# Patient Record
Sex: FEMALE | Race: WHITE | NOT HISPANIC OR LATINO | Employment: OTHER | URBAN - METROPOLITAN AREA
[De-identification: names, ages, dates, MRNs, and addresses within clinical notes are randomized per-mention and may not be internally consistent; named-entity substitution may affect disease eponyms.]

---

## 2017-02-09 ENCOUNTER — OFFICE VISIT (OUTPATIENT)
Dept: FAMILY MEDICINE CLINIC | Facility: CLINIC | Age: 82
End: 2017-02-09

## 2017-02-09 VITALS
HEART RATE: 69 BPM | DIASTOLIC BLOOD PRESSURE: 60 MMHG | TEMPERATURE: 98.2 F | OXYGEN SATURATION: 99 % | SYSTOLIC BLOOD PRESSURE: 120 MMHG

## 2017-02-09 DIAGNOSIS — N18.30 CHRONIC KIDNEY DISEASE, STAGE III (MODERATE) (HCC): ICD-10-CM

## 2017-02-09 DIAGNOSIS — I10 ESSENTIAL HYPERTENSION: Primary | ICD-10-CM

## 2017-02-09 DIAGNOSIS — IMO0001 UNCONTROLLED TYPE 2 DIABETES MELLITUS WITHOUT COMPLICATION, WITHOUT LONG-TERM CURRENT USE OF INSULIN: ICD-10-CM

## 2017-02-09 DIAGNOSIS — E78.49 OTHER HYPERLIPIDEMIA: ICD-10-CM

## 2017-02-09 LAB
ALBUMIN SERPL-MCNC: 4.3 G/DL (ref 3.5–5.2)
ALBUMIN/GLOB SERPL: 1.7 G/DL
ALP SERPL-CCNC: 64 U/L (ref 39–117)
ALT SERPL-CCNC: 18 U/L (ref 1–33)
AST SERPL-CCNC: 18 U/L (ref 1–32)
BILIRUB SERPL-MCNC: 1.5 MG/DL (ref 0.1–1.2)
BUN SERPL-MCNC: 24 MG/DL (ref 8–23)
BUN/CREAT SERPL: 16 (ref 7–25)
CALCIUM SERPL-MCNC: 10 MG/DL (ref 8.6–10.5)
CHLORIDE SERPL-SCNC: 99 MMOL/L (ref 98–107)
CHOLEST SERPL-MCNC: 173 MG/DL (ref 0–200)
CO2 SERPL-SCNC: 28 MMOL/L (ref 22–29)
CREAT SERPL-MCNC: 1.5 MG/DL (ref 0.57–1)
GLOBULIN SER CALC-MCNC: 2.6 GM/DL
GLUCOSE SERPL-MCNC: 158 MG/DL (ref 65–99)
HBA1C MFR BLD: 8 % (ref 4.8–5.6)
HDLC SERPL-MCNC: 59 MG/DL (ref 40–60)
LDLC SERPL CALC-MCNC: 90 MG/DL (ref 0–100)
LDLC/HDLC SERPL: 1.53 {RATIO}
POTASSIUM SERPL-SCNC: 4.5 MMOL/L (ref 3.5–5.2)
PROT SERPL-MCNC: 6.9 G/DL (ref 6–8.5)
SODIUM SERPL-SCNC: 141 MMOL/L (ref 136–145)
TRIGL SERPL-MCNC: 120 MG/DL (ref 0–150)
VLDLC SERPL CALC-MCNC: 24 MG/DL (ref 5–40)

## 2017-02-09 PROCEDURE — 99213 OFFICE O/P EST LOW 20 MIN: CPT | Performed by: FAMILY MEDICINE

## 2017-02-09 NOTE — PROGRESS NOTES
Subjective   Carrie Workman is a 83 y.o. female. CC: high cholesterol  CC: hyperlipidemia  History of Present Illness   Carrie is an 83 year old female who comes in today for her blood pressure and cholesterol checks.  She reports that she is doing about the same.  Her daughter states that she eats very little.  They have tried to get her to drink Ensure but she refuses to do that.  She has an appointment to see Dr. Mishra in 2 weeks.        The following portions of the patient's history were reviewed and updated as appropriate: allergies, current medications, past medical history, past social history, past surgical history and problem list.    Review of Systems   Constitutional: Positive for appetite change. Negative for fatigue and unexpected weight change.   HENT: Negative.  Negative for congestion.    Respiratory: Negative.  Negative for cough, shortness of breath and wheezing.    Cardiovascular: Negative.  Negative for chest pain, palpitations and leg swelling.   Gastrointestinal: Negative.  Negative for abdominal pain, blood in stool, nausea and vomiting.   Genitourinary: Positive for difficulty urinating.   Musculoskeletal: Negative.  Negative for arthralgias and back pain.   Skin: Negative.  Negative for rash.   Neurological: Negative.  Negative for dizziness, light-headedness and headaches.   Psychiatric/Behavioral: Negative.  Negative for confusion, dysphoric mood and sleep disturbance. The patient is not nervous/anxious.        Objective   Physical Exam   Constitutional: She is oriented to person, place, and time. She appears well-developed and well-nourished.   Neck: Normal range of motion. Neck supple. No JVD present. Carotid bruit is not present. No thyromegaly present.   Cardiovascular: Normal rate, regular rhythm and normal heart sounds.    No murmur heard.  Pulmonary/Chest: Effort normal and breath sounds normal. No respiratory distress.   Lymphadenopathy:     She has no cervical adenopathy.    Neurological: She is alert and oriented to person, place, and time.   Skin: Skin is warm and dry. No rash noted.   Psychiatric: She has a normal mood and affect. Her behavior is normal.   Nursing note and vitals reviewed.    Vitals:    02/09/17 1001   BP: 120/60   Pulse: 69   Temp: 98.2 °F (36.8 °C)   SpO2: 99%     Assessment/Plan   Problems Addressed this Visit        Cardiovascular and Mediastinum    Hypertension - Primary    Hyperlipidemia       Endocrine    Uncontrolled type 2 diabetes mellitus without complication, without long-term current use of insulin    Relevant Orders    Comprehensive metabolic panel    Lipid Panel With LDL/HDL Ratio    Hemoglobin A1c       Genitourinary    Chronic kidney disease, stage III (moderate)    Relevant Orders    Comprehensive metabolic panel

## 2017-02-21 ENCOUNTER — OFFICE VISIT (OUTPATIENT)
Dept: CARDIOLOGY | Facility: CLINIC | Age: 82
End: 2017-02-21

## 2017-02-21 VITALS
BODY MASS INDEX: 27.48 KG/M2 | DIASTOLIC BLOOD PRESSURE: 68 MMHG | SYSTOLIC BLOOD PRESSURE: 118 MMHG | WEIGHT: 140 LBS | HEART RATE: 68 BPM | HEIGHT: 60 IN

## 2017-02-21 DIAGNOSIS — I10 ESSENTIAL HYPERTENSION: ICD-10-CM

## 2017-02-21 DIAGNOSIS — N18.30 CHRONIC KIDNEY DISEASE, STAGE III (MODERATE) (HCC): ICD-10-CM

## 2017-02-21 DIAGNOSIS — I11.9 BENIGN HYPERTENSIVE HEART DISEASE WITHOUT CONGESTIVE HEART FAILURE: Primary | ICD-10-CM

## 2017-02-21 DIAGNOSIS — IMO0001 UNCONTROLLED TYPE 2 DIABETES MELLITUS WITHOUT COMPLICATION, WITHOUT LONG-TERM CURRENT USE OF INSULIN: ICD-10-CM

## 2017-02-21 PROCEDURE — 99213 OFFICE O/P EST LOW 20 MIN: CPT | Performed by: INTERNAL MEDICINE

## 2017-02-21 PROCEDURE — 93000 ELECTROCARDIOGRAM COMPLETE: CPT | Performed by: INTERNAL MEDICINE

## 2017-02-21 NOTE — PROGRESS NOTES
Date of Office Visit: 2017  Encounter Provider: Aishwarya Mishra MD  Place of Service: [facility] CARDIOLOGY  Patient Name: Carrie Workman  :3/2/1933    Chief complaint  Follow-up of hypertension with hypertensive heart disease.    History of Present Illness  The patient is an 83-year-old female with history of hypertension, hyperlipidemia, diabetes, and hypertensive heart disease. She had a stress perfusion study in  that was negative for ischemia. An echocardiogram in  revealed mild left ventricular hypertrophy, normal systolic function, mild left atrial enlargement, and aortic valve sclerosis.  In  due to an abnormal EKG she had a Lexiscan perfusion study that was negative for ischemia.  Clinically, she has done well. She now presents for follow-up.     Since last visit she remains wheelchair-bound.  She is not receiving physical therapy her blood pressures been as it is today.  She denies any chest pain, shortness of breath, palpitations, syncope.  Overall she feels well.  She had blood work in February the creatinine of 1.5 and a GFR 40.  Lipids were under excellent control.  Hemeglobin A1c remains elevated    Past Medical History   Diagnosis Date   • Abnormal electrocardiogram    • Ataxia      chronic, unknown etiology   • Benign hypertensive heart disease    • CHF (congestive heart failure)    • CKD (chronic kidney disease)    • Diabetes mellitus    • Dyslipidemia    • Health care maintenance    • History of EKG 2013   • Hyperlipidemia    • Hypertension    • Renal cyst    • Renal insufficiency    • TIA (transient ischemic attack)         • Urinary frequency    • Visit for screening mammogram      No past surgical history on file.  Outpatient Medications Prior to Visit   Medication Sig Dispense Refill   • aspirin 81 MG tablet Take 81 mg by mouth daily.     • bumetanide (BUMEX) 2 MG tablet Take 1 mg by mouth 3 (three) times a week.   Friday     • Calcium-Vitamin D-Vitamin K (VIACTIV) 500-500-40 MG-UNT-MCG chewable tablet Chew 1 tablet daily.     • fosinopril (MONOPRIL) 10 MG tablet Take 1 tablet by mouth daily. 30 tablet 3   • metoprolol succinate XL (TOPROL-XL) 50 MG 24 hr tablet Take 1 tablet (50mg) po daily (Patient taking differently: Take 50 mg by mouth 2 (Two) Times a Day.)     • pravastatin (PRAVACHOL) 40 MG tablet Take 1 tablet by mouth daily. 90 tablet 3   • vitamin E 400 UNIT capsule Take 400 Units by mouth.     • fosinopril (MONOPRIL) 20 MG tablet Take 1 tablet by mouth daily. 90 tablet 1   • HYDROcodone-acetaminophen (NORCO) 5-325 MG per tablet Take 1 tablet by mouth Every 4 (Four) Hours As Needed for moderate pain (4-6). 12 tablet 0     No facility-administered medications prior to visit.      Allergies as of 02/21/2017   • (No Known Allergies)     Social History     Social History   • Marital status:      Spouse name: N/A   • Number of children: N/A   • Years of education: N/A     Occupational History   • Not on file.     Social History Main Topics   • Smoking status: Former Smoker   • Smokeless tobacco: Not on file   • Alcohol use No   • Drug use: Defer   • Sexual activity: Defer     Other Topics Concern   • Not on file     Social History Narrative     Family History   Problem Relation Age of Onset   • Cancer Mother      malignant neoplasm of urinary bladder   • Diabetes Father    • Lung cancer Father    • Diabetes Other    • Lung cancer Other      Review of Systems   Constitution: Negative for fever, malaise/fatigue, weight gain and weight loss.   HENT: Negative for ear pain, hearing loss, nosebleeds and sore throat.    Eyes: Negative for double vision, pain, vision loss in left eye and vision loss in right eye.   Cardiovascular:        See history of present illness.   Respiratory: Negative for cough, shortness of breath, sleep disturbances due to breathing, snoring and wheezing.    Endocrine: Negative for cold  "intolerance, heat intolerance and polyuria.   Skin: Negative for itching, poor wound healing and rash.   Musculoskeletal: Negative for joint pain, joint swelling and myalgias.   Gastrointestinal: Negative for abdominal pain, diarrhea, hematochezia, nausea and vomiting.   Genitourinary: Negative for hematuria and hesitancy.   Neurological: Negative for numbness, paresthesias and seizures.   Psychiatric/Behavioral: Negative for depression. The patient is not nervous/anxious.      Objective:     Vitals:    02/21/17 0952   BP: 118/68   Pulse: 68   Weight: 140 lb (63.5 kg)   Height: 60\" (152.4 cm)     Body mass index is 27.34 kg/(m^2).    Physical Exam   Constitutional: She is oriented to person, place, and time. She appears well-developed and well-nourished.   HENT:   Head: Normocephalic.   Nose: Nose normal.   Mouth/Throat: Oropharynx is clear and moist.   Eyes: Conjunctivae and EOM are normal. Pupils are equal, round, and reactive to light. Right eye exhibits no discharge. No scleral icterus.   Neck: Normal range of motion. Neck supple. No JVD present. No thyromegaly present.   Cardiovascular: Normal rate, regular rhythm, normal heart sounds and intact distal pulses.  Exam reveals no gallop and no friction rub.    No murmur heard.  Pulses:       Carotid pulses are 2+ on the right side, and 2+ on the left side.       Radial pulses are 2+ on the right side, and 2+ on the left side.        Femoral pulses are 2+ on the right side, and 2+ on the left side.       Popliteal pulses are 2+ on the right side, and 2+ on the left side.        Dorsalis pedis pulses are 2+ on the right side, and 2+ on the left side.        Posterior tibial pulses are 2+ on the right side, and 2+ on the left side.   Pulmonary/Chest: Effort normal and breath sounds normal. No respiratory distress. She has no wheezes. She has no rales.   Abdominal: Soft. Bowel sounds are normal. She exhibits no distension. There is no hepatosplenomegaly. There is no " tenderness. There is no rebound.   Musculoskeletal: Normal range of motion. She exhibits no edema or tenderness.   Neurological: She is alert and oriented to person, place, and time.   Skin: Skin is warm and dry. No rash noted. No erythema.   Psychiatric: She has a normal mood and affect. Her behavior is normal. Judgment and thought content normal.   Vitals reviewed.    Lab Review:     ECG 12 Lead  Date/Time: 2/21/2017 6:33 PM  Performed by: SANDRA MCHUGH  Authorized by: SANDRA MCHUGH   Comparison: compared with previous ECG   Similar to previous ECG  Rhythm: sinus rhythm  Conduction: incomplete RBBB and LAFB  Conduction comments: QTc = 451msec            Assessment:       Diagnosis Plan   1. Benign hypertensive heart disease without congestive heart failure     2. Essential hypertension     3. Uncontrolled type 2 diabetes mellitus without complication, without long-term current use of insulin     4. Chronic kidney disease, stage III (moderate)       Plan:       1.  Hypertension,, continue the current regimen follow-up in one year  2.  Hypertensive heart disease  3.  Sedentary state  4.  Diabetes  5.  Dyslipidemia  6.  Renal insufficiency followed by Dr. Mcneal and Dr. Hunt and felt to be stable     Carrie Workman   Home Medication Instructions [facility]:    Printed on:02/23/17 0136   Medication Information                      aspirin 81 MG tablet  Take 81 mg by mouth daily.             bumetanide (BUMEX) 2 MG tablet  Take 1 mg by mouth 3 (three) times a week. Monday Wednesday Friday             Calcium-Vitamin D-Vitamin K (VIACTIV) 500-500-40 MG-UNT-MCG chewable tablet  Chew 1 tablet daily.             fosinopril (MONOPRIL) 10 MG tablet  Take 1 tablet by mouth daily.             metoprolol succinate XL (TOPROL-XL) 50 MG 24 hr tablet  Take 1 tablet (50mg) po daily             pravastatin (PRAVACHOL) 40 MG tablet  Take 1 tablet by mouth daily.             vitamin E 400 UNIT capsule  Take 400 Units by mouth.                EMR Dragon/Transcription disclaimer:   Much of this encounter note is an electronic transcription/translation of spoken language to printed text. The electronic translation of spoken language may permit erroneous, or at times, nonsensical words or phrases to be inadvertently transcribed; Although I have reviewed the note for such errors, some may still exist.

## 2017-06-21 RX ORDER — METOPROLOL SUCCINATE 50 MG/1
TABLET, EXTENDED RELEASE ORAL
Qty: 180 TABLET | Refills: 0 | OUTPATIENT
Start: 2017-06-21

## 2017-06-21 RX ORDER — METOPROLOL SUCCINATE 50 MG/1
TABLET, EXTENDED RELEASE ORAL
Qty: 90 TABLET | Refills: 3 | Status: SHIPPED | OUTPATIENT
Start: 2017-06-21 | End: 2017-08-22 | Stop reason: SDUPTHER

## 2017-06-21 NOTE — TELEPHONE ENCOUNTER
Please confirm how she is taking this.  When I try to approve it gives me three different ways she is taking it

## 2017-07-06 DIAGNOSIS — E78.5 HYPERLIPIDEMIA: ICD-10-CM

## 2017-07-06 RX ORDER — PRAVASTATIN SODIUM 40 MG
TABLET ORAL
Qty: 90 TABLET | Refills: 3 | Status: SHIPPED | OUTPATIENT
Start: 2017-07-06 | End: 2018-06-25 | Stop reason: SDUPTHER

## 2017-07-17 RX ORDER — FOSINOPRIL SODIUM 10 MG/1
10 TABLET ORAL DAILY
Qty: 90 TABLET | Refills: 1 | Status: SHIPPED | OUTPATIENT
Start: 2017-07-17 | End: 2018-01-07 | Stop reason: SDUPTHER

## 2017-08-22 ENCOUNTER — TELEPHONE (OUTPATIENT)
Dept: FAMILY MEDICINE CLINIC | Facility: CLINIC | Age: 82
End: 2017-08-22

## 2017-08-22 RX ORDER — METOPROLOL SUCCINATE 50 MG/1
TABLET, EXTENDED RELEASE ORAL
Qty: 90 TABLET | Refills: 3 | Status: SHIPPED | OUTPATIENT
Start: 2017-08-22 | End: 2018-03-08

## 2017-08-22 NOTE — TELEPHONE ENCOUNTER
Pt daughter dov would like to speak with you (she is on pt hipaa) her daughter said that she has an appointment with you on the 24th. She said that pt lies to them about a lot and was wondering if she can inform you of some things she has noticed. She doesn't want to speak with me.

## 2017-08-24 ENCOUNTER — OFFICE VISIT (OUTPATIENT)
Dept: FAMILY MEDICINE CLINIC | Facility: CLINIC | Age: 82
End: 2017-08-24

## 2017-08-24 VITALS
WEIGHT: 141.5 LBS | HEART RATE: 84 BPM | TEMPERATURE: 98.4 F | HEIGHT: 60 IN | BODY MASS INDEX: 27.78 KG/M2 | SYSTOLIC BLOOD PRESSURE: 105 MMHG | DIASTOLIC BLOOD PRESSURE: 60 MMHG | OXYGEN SATURATION: 97 %

## 2017-08-24 DIAGNOSIS — E78.49 OTHER HYPERLIPIDEMIA: ICD-10-CM

## 2017-08-24 DIAGNOSIS — F41.9 ANXIETY: ICD-10-CM

## 2017-08-24 DIAGNOSIS — IMO0001 UNCONTROLLED TYPE 2 DIABETES MELLITUS WITHOUT COMPLICATION, WITHOUT LONG-TERM CURRENT USE OF INSULIN: Primary | ICD-10-CM

## 2017-08-24 DIAGNOSIS — IMO0001 UNCONTROLLED TYPE 2 DIABETES MELLITUS WITHOUT COMPLICATION, WITHOUT LONG-TERM CURRENT USE OF INSULIN: ICD-10-CM

## 2017-08-24 DIAGNOSIS — Z23 ENCOUNTER FOR IMMUNIZATION: ICD-10-CM

## 2017-08-24 DIAGNOSIS — I11.9 BENIGN HYPERTENSIVE HEART DISEASE WITHOUT CONGESTIVE HEART FAILURE: Primary | ICD-10-CM

## 2017-08-24 LAB
ALBUMIN SERPL-MCNC: 4.3 G/DL (ref 3.5–5.2)
ALBUMIN/GLOB SERPL: 1.5 G/DL
ALP SERPL-CCNC: 67 U/L (ref 39–117)
ALT SERPL-CCNC: 19 U/L (ref 1–33)
AST SERPL-CCNC: 19 U/L (ref 1–32)
BILIRUB SERPL-MCNC: 1.7 MG/DL (ref 0.1–1.2)
BUN SERPL-MCNC: 16 MG/DL (ref 8–23)
BUN/CREAT SERPL: 10.9 (ref 7–25)
CALCIUM SERPL-MCNC: 9.6 MG/DL (ref 8.6–10.5)
CHLORIDE SERPL-SCNC: 98 MMOL/L (ref 98–107)
CHOLEST SERPL-MCNC: 136 MG/DL (ref 0–200)
CO2 SERPL-SCNC: 25.8 MMOL/L (ref 22–29)
CREAT SERPL-MCNC: 1.47 MG/DL (ref 0.57–1)
GLOBULIN SER CALC-MCNC: 2.8 GM/DL
GLUCOSE SERPL-MCNC: 214 MG/DL (ref 65–99)
HBA1C MFR BLD: 10.8 % (ref 4.8–5.6)
HDLC SERPL-MCNC: 53 MG/DL (ref 40–60)
LDLC SERPL CALC-MCNC: 61 MG/DL (ref 0–100)
LDLC/HDLC SERPL: 1.15 {RATIO}
POTASSIUM SERPL-SCNC: 4.2 MMOL/L (ref 3.5–5.2)
PROT SERPL-MCNC: 7.1 G/DL (ref 6–8.5)
SODIUM SERPL-SCNC: 142 MMOL/L (ref 136–145)
TRIGL SERPL-MCNC: 111 MG/DL (ref 0–150)
VLDLC SERPL CALC-MCNC: 22.2 MG/DL (ref 5–40)

## 2017-08-24 PROCEDURE — 99213 OFFICE O/P EST LOW 20 MIN: CPT | Performed by: FAMILY MEDICINE

## 2017-08-24 PROCEDURE — G0009 ADMIN PNEUMOCOCCAL VACCINE: HCPCS | Performed by: FAMILY MEDICINE

## 2017-08-24 PROCEDURE — 90670 PCV13 VACCINE IM: CPT | Performed by: FAMILY MEDICINE

## 2017-08-24 RX ORDER — GLIMEPIRIDE 2 MG/1
2 TABLET ORAL
Qty: 30 TABLET | Refills: 5 | Status: SHIPPED | OUTPATIENT
Start: 2017-08-24 | End: 2018-01-18 | Stop reason: SDUPTHER

## 2017-08-24 RX ORDER — CITALOPRAM 10 MG/1
10 TABLET ORAL DAILY
Qty: 30 TABLET | Refills: 2 | Status: SHIPPED | OUTPATIENT
Start: 2017-08-24 | End: 2017-10-25 | Stop reason: SDUPTHER

## 2017-08-24 NOTE — PROGRESS NOTES
Subjective   Carrie Workman is a 84 y.o. female. CC: hypertension    History of Present Illness   Carrie is an 84 year old female who comes in today for her blood pressure check.  Her blood pressure has been under good control.  Her family is concerned because she is not eating very much.  She often doesn't go to the dining room at her assisted living facility and even when she does she eats very little.  Has not had any weight los.  Her sense of smell is not good and this probably has a lot to do with the fact that she is not eating much.  Her daughter is also concerned because she seems to be depressed all the time.  She states that she puts on a good show here and won't admit it to me.  She talks about how she doesn't know why she is still alive.  She doesn't want to go out anywhere.  She has greatly impaired vision and this does cause a lot of problems since she is not able to read and has trouble seeing the television.  Discussed audio books and her daughter states that she will look into that.  She has trouble walking due to arthritis.  She stays tired a lot.  She is due to have her cholesterol checked today.    She is diabetic and is due to have her sugar checked.        The following portions of the patient's history were reviewed and updated as appropriate: allergies, current medications, past medical history, past social history, past surgical history and problem list.    Review of Systems   Constitutional: Positive for appetite change (no appetite) and fatigue. Negative for unexpected weight change.   HENT: Negative.  Negative for congestion and sinus pressure.    Eyes: Positive for visual disturbance.   Respiratory: Negative.  Negative for cough, shortness of breath and wheezing.    Cardiovascular: Negative.  Negative for chest pain, palpitations and leg swelling.   Gastrointestinal: Negative.  Negative for abdominal pain, blood in stool, constipation, diarrhea, nausea and vomiting.   Genitourinary:  Negative.  Negative for difficulty urinating and hematuria.   Musculoskeletal: Positive for arthralgias. Negative for back pain.   Skin: Negative.  Negative for rash.   Psychiatric/Behavioral: Negative.  Negative for dysphoric mood and sleep disturbance. The patient is not nervous/anxious.        Objective   Physical Exam   Constitutional: She is oriented to person, place, and time. She appears well-developed and well-nourished.   Neck: Normal range of motion. Neck supple.   Cardiovascular: Normal rate, regular rhythm and normal heart sounds.    Pulmonary/Chest: Effort normal and breath sounds normal.   Neurological: She is alert and oriented to person, place, and time.   Skin: Skin is warm and dry. No rash noted.   Psychiatric: She has a normal mood and affect. Her behavior is normal.   Nursing note and vitals reviewed.    Vitals:    08/24/17 0850   BP: 105/60   Pulse: 84   Temp: 98.4 °F (36.9 °C)   SpO2: 97%     Assessment/Plan   Problems Addressed this Visit        Cardiovascular and Mediastinum    Benign hypertensive heart disease without congestive heart failure - Primary    Hyperlipidemia    Relevant Orders    Comprehensive metabolic panel    Lipid Panel With LDL/HDL Ratio       Endocrine    Uncontrolled type 2 diabetes mellitus without complication, without long-term current use of insulin    Relevant Orders    Hemoglobin A1c      Other Visit Diagnoses     Encounter for immunization        Relevant Orders    Pneumococcal Conjugate Vaccine 13-Valent All (Completed)    Anxiety          After obtaining informed consent, the immunization is given by Viktoriya BUTCHER    She is started on Celexa 10 mg a day.  Her daughter will let me know if doesn't seem to be helping after one month and then the dose will be adjusted.

## 2017-10-25 RX ORDER — CITALOPRAM 10 MG/1
10 TABLET ORAL DAILY
Qty: 30 TABLET | Refills: 5 | Status: SHIPPED | OUTPATIENT
Start: 2017-10-25 | End: 2017-10-26 | Stop reason: SDUPTHER

## 2017-10-27 RX ORDER — CITALOPRAM 10 MG/1
10 TABLET ORAL DAILY
Qty: 30 TABLET | Refills: 5 | Status: SHIPPED | OUTPATIENT
Start: 2017-10-27

## 2017-11-16 ENCOUNTER — OFFICE VISIT (OUTPATIENT)
Dept: FAMILY MEDICINE CLINIC | Facility: CLINIC | Age: 82
End: 2017-11-16

## 2017-11-16 VITALS
HEIGHT: 60 IN | HEART RATE: 69 BPM | DIASTOLIC BLOOD PRESSURE: 60 MMHG | TEMPERATURE: 97.7 F | BODY MASS INDEX: 27.13 KG/M2 | SYSTOLIC BLOOD PRESSURE: 105 MMHG | WEIGHT: 138.2 LBS | OXYGEN SATURATION: 97 %

## 2017-11-16 DIAGNOSIS — I11.9 BENIGN HYPERTENSIVE HEART DISEASE WITHOUT CONGESTIVE HEART FAILURE: ICD-10-CM

## 2017-11-16 DIAGNOSIS — E78.49 OTHER HYPERLIPIDEMIA: ICD-10-CM

## 2017-11-16 DIAGNOSIS — IMO0001 UNCONTROLLED TYPE 2 DIABETES MELLITUS WITHOUT COMPLICATION, WITHOUT LONG-TERM CURRENT USE OF INSULIN: Primary | ICD-10-CM

## 2017-11-16 PROCEDURE — 99213 OFFICE O/P EST LOW 20 MIN: CPT | Performed by: FAMILY MEDICINE

## 2017-11-16 NOTE — PROGRESS NOTES
Subjective   Carrie Workman is a 84 y.o. female. CC: hypertension    History of Present Illness     Carrie is an 84 year old female who comes in today for her blood pressure check.  Her blood pressure is stable.  She still has no appetite.  Eats very little.  States it doesn't make her sick to eat, she just doesn't want anything.  She has lost 3 lbs since her last visit.  She has hyperlipidemia and is due to have her cholesterol checked today.  Not having any swelling.  Still not doing much at the assisted living facility where she lives.  Has vision problems so can't read and it is difficult to watch TV.        The following portions of the patient's history were reviewed and updated as appropriate: allergies, current medications, past medical history, past social history, past surgical history and problem list.    Review of Systems   Constitutional: Positive for appetite change. Negative for fatigue and unexpected weight change.   HENT: Negative.  Negative for congestion.    Respiratory: Negative.  Negative for cough, shortness of breath and wheezing.    Cardiovascular: Negative.  Negative for chest pain, palpitations and leg swelling.   Gastrointestinal: Negative.  Negative for abdominal pain, diarrhea and nausea.   Genitourinary: Negative.  Negative for difficulty urinating and hematuria.   Musculoskeletal: Positive for arthralgias. Negative for back pain.   Skin: Negative.  Negative for rash.   Neurological: Negative.  Negative for dizziness, light-headedness and headaches.   Psychiatric/Behavioral: Negative.  Negative for confusion, dysphoric mood and sleep disturbance. The patient is not nervous/anxious.        Objective   Physical Exam   Constitutional: She is oriented to person, place, and time. She appears well-developed and well-nourished.   Neck: Normal range of motion. Neck supple.   Cardiovascular: Normal rate, regular rhythm and normal heart sounds.    Pulmonary/Chest: Effort normal and breath  "sounds normal.   Musculoskeletal: She exhibits no edema.   Neurological: She is alert and oriented to person, place, and time.   Skin: Skin is warm and dry. No rash noted.   Psychiatric: She has a normal mood and affect. Her behavior is normal.   Nursing note and vitals reviewed.    Vitals:    11/16/17 0900   BP: 105/60   Pulse: 69   Temp: 97.7 °F (36.5 °C)   SpO2: 97%   Weight: 138 lb 3.2 oz (62.7 kg)   Height: 60\" (152.4 cm)     Assessment/Plan   Problems Addressed this Visit        Cardiovascular and Mediastinum    Benign hypertensive heart disease without congestive heart failure    Hyperlipidemia    Relevant Orders    Comprehensive metabolic panel    Lipid Panel With LDL/HDL Ratio       Endocrine    Uncontrolled type 2 diabetes mellitus without complication, without long-term current use of insulin - Primary    Relevant Orders    Hemoglobin A1c    MicroAlbumin, Urine, Random - Urine, Clean Catch        She will follow up with Dr. Mayorga in 3 months.         "

## 2017-11-17 LAB
ALBUMIN SERPL-MCNC: 4 G/DL (ref 3.5–4.7)
ALBUMIN/GLOB SERPL: 1.7 {RATIO} (ref 1.2–2.2)
ALP SERPL-CCNC: 57 IU/L (ref 39–117)
ALT SERPL-CCNC: 16 IU/L (ref 0–32)
AST SERPL-CCNC: 23 IU/L (ref 0–40)
BILIRUB SERPL-MCNC: 1.5 MG/DL (ref 0–1.2)
BUN SERPL-MCNC: 20 MG/DL (ref 8–27)
BUN/CREAT SERPL: 14 (ref 12–28)
CALCIUM SERPL-MCNC: 9.5 MG/DL (ref 8.7–10.3)
CHLORIDE SERPL-SCNC: 101 MMOL/L (ref 96–106)
CHOLEST SERPL-MCNC: 140 MG/DL (ref 100–199)
CO2 SERPL-SCNC: 23 MMOL/L (ref 18–29)
CREAT SERPL-MCNC: 1.41 MG/DL (ref 0.57–1)
GFR SERPLBLD CREATININE-BSD FMLA CKD-EPI: 34 ML/MIN/1.73
GFR SERPLBLD CREATININE-BSD FMLA CKD-EPI: 39 ML/MIN/1.73
GLOBULIN SER CALC-MCNC: 2.4 G/DL (ref 1.5–4.5)
GLUCOSE SERPL-MCNC: 79 MG/DL (ref 65–99)
HBA1C MFR BLD: 6.6 % (ref 4.8–5.6)
HDLC SERPL-MCNC: 54 MG/DL
LDLC SERPL CALC-MCNC: 67 MG/DL (ref 0–99)
LDLC/HDLC SERPL: 1.2 RATIO UNITS (ref 0–3.2)
MICROALBUMIN UR-MCNC: 10.1 UG/ML
MICROALBUMIN UR-MCNC: NORMAL UG/ML
POTASSIUM SERPL-SCNC: 4.2 MMOL/L (ref 3.5–5.2)
PROT SERPL-MCNC: 6.4 G/DL (ref 6–8.5)
SODIUM SERPL-SCNC: 144 MMOL/L (ref 134–144)
SPECIMEN STATUS: NORMAL
TRIGL SERPL-MCNC: 94 MG/DL (ref 0–149)
VLDLC SERPL CALC-MCNC: 19 MG/DL (ref 5–40)

## 2018-01-09 RX ORDER — FOSINOPRIL SODIUM 10 MG/1
TABLET ORAL
Qty: 90 TABLET | Refills: 1 | Status: SHIPPED | OUTPATIENT
Start: 2018-01-09 | End: 2018-02-23 | Stop reason: SDUPTHER

## 2018-01-18 ENCOUNTER — TELEPHONE (OUTPATIENT)
Dept: FAMILY MEDICINE CLINIC | Facility: CLINIC | Age: 83
End: 2018-01-18

## 2018-01-18 DIAGNOSIS — IMO0001 UNCONTROLLED TYPE 2 DIABETES MELLITUS WITHOUT COMPLICATION, WITHOUT LONG-TERM CURRENT USE OF INSULIN: ICD-10-CM

## 2018-01-18 RX ORDER — ONDANSETRON 4 MG/1
4 TABLET, ORALLY DISINTEGRATING ORAL EVERY 8 HOURS PRN
Qty: 12 TABLET | Refills: 0 | Status: SHIPPED | OUTPATIENT
Start: 2018-01-18

## 2018-01-18 RX ORDER — GLIMEPIRIDE 2 MG/1
2 TABLET ORAL
Qty: 30 TABLET | Refills: 5 | Status: SHIPPED | OUTPATIENT
Start: 2018-01-18 | End: 2018-07-10 | Stop reason: SDUPTHER

## 2018-01-18 NOTE — TELEPHONE ENCOUNTER
Patient's daughter called stating that her mother is currently at [facility] and was diagnosed with the flu, they are requesting a prescription for diarrhea and nausea sent to her pharmacy [facility]

## 2018-01-24 ENCOUNTER — TELEPHONE (OUTPATIENT)
Dept: FAMILY MEDICINE CLINIC | Facility: CLINIC | Age: 83
End: 2018-01-24

## 2018-01-24 NOTE — TELEPHONE ENCOUNTER
Nurse from [facility] called requesting orders for patient to have nursing evaluation and treatment. The facility is on lockdown  due to stomach virus. The order can be put in [facility] if possible

## 2018-01-26 ENCOUNTER — TELEPHONE (OUTPATIENT)
Dept: FAMILY MEDICINE CLINIC | Facility: CLINIC | Age: 83
End: 2018-01-26

## 2018-01-26 NOTE — TELEPHONE ENCOUNTER
Malinda from [facility], called requesting a verbal for skilled nursing once a week for 2 weeks as well as OT/PT to come in and work with patient to help build strength

## 2018-01-29 ENCOUNTER — TELEPHONE (OUTPATIENT)
Dept: FAMILY MEDICINE CLINIC | Facility: CLINIC | Age: 83
End: 2018-01-29

## 2018-01-29 NOTE — TELEPHONE ENCOUNTER
Spoke with Malinda at [facility] to approve nursing, pt, ot  orders per Dr Mayorga for patient

## 2018-02-01 ENCOUNTER — TELEPHONE (OUTPATIENT)
Dept: FAMILY MEDICINE CLINIC | Facility: CLINIC | Age: 83
End: 2018-02-01

## 2018-02-01 NOTE — TELEPHONE ENCOUNTER
Physical Therapy Fred called to get the verbal ok to see patient 2 times a week for 4 weeks, i approved this order for physical therapy

## 2018-02-23 ENCOUNTER — OFFICE VISIT (OUTPATIENT)
Dept: FAMILY MEDICINE CLINIC | Facility: CLINIC | Age: 83
End: 2018-02-23

## 2018-02-23 VITALS — HEART RATE: 73 BPM | OXYGEN SATURATION: 94 % | SYSTOLIC BLOOD PRESSURE: 100 MMHG | DIASTOLIC BLOOD PRESSURE: 60 MMHG

## 2018-02-23 DIAGNOSIS — N39.0 URINARY TRACT INFECTION WITHOUT HEMATURIA, SITE UNSPECIFIED: ICD-10-CM

## 2018-02-23 DIAGNOSIS — R29.6 FREQUENT FALLS: Primary | ICD-10-CM

## 2018-02-23 DIAGNOSIS — R53.1 WEAKNESS: ICD-10-CM

## 2018-02-23 PROCEDURE — 99214 OFFICE O/P EST MOD 30 MIN: CPT | Performed by: NURSE PRACTITIONER

## 2018-02-23 RX ORDER — FOSINOPRIL SODIUM 10 MG/1
5 TABLET ORAL DAILY
Qty: 45 TABLET | Refills: 1 | Status: SHIPPED | OUTPATIENT
Start: 2018-02-23 | End: 2018-03-01

## 2018-02-23 RX ORDER — SULFAMETHOXAZOLE AND TRIMETHOPRIM 800; 160 MG/1; MG/1
1 TABLET ORAL DAILY
Qty: 5 TABLET | Refills: 0 | OUTPATIENT
Start: 2018-02-23 | End: 2018-02-28

## 2018-02-23 NOTE — PATIENT INSTRUCTIONS
Discharge instructions      Bactrim DS  1 tablet daily ×5 days for possible UTI    Plenty of fluids    Discontinue generic Bumex or bumetamide     Decrease fosinopril 5 mg daily( 1/2 10mg tab)    Increase physical therapy to 3 times a week    Family to assist with exercises with transferring  Each visit if cooperation    Strengthening    If frequent syncope chest pain shortness of breath slurred speech increase or increased weakness emergency room  Or high fever pain    Falls precautions    Recheck in 2 weeks or with Dr. Mishra     Schedule appointment with Dr. Mishra as well  Discussed blood pressure as well as falls during transfers  Follow instructions regarding blood pressure medication with Dr. Mishra see her within the next month  \  Thank You,      James Epley, NP

## 2018-02-26 ENCOUNTER — TELEPHONE (OUTPATIENT)
Dept: FAMILY MEDICINE CLINIC | Facility: CLINIC | Age: 83
End: 2018-02-26

## 2018-02-26 NOTE — PROGRESS NOTES
Subjective   Carrie Workman is a 84 y.o. female.     HPI Comments: Patient in assisted living  She's able to transfer herself but not ambulate  She said more falls recently, she is not here for any injury  And does not have any present injuries.  She said unit tract infection several times, has called weakness in the past and Van is afraid she has a UTI  No dysuria frequency urgency, but she's had falls in the past with UTI    She has hypertension  Sees cardiology  Blood pressure 100/60  In November 105/60  She has no fluid restriction  She does have diabetes which is controlled presently  No history of chronic congestive heart failure  Does not drink enough fluids based on history    Presently no weakness chest pain shorts and breath  No increased confusion slurred speech or weakness  She's able to provide some of the history and her family supplements the rest    Physical therapy but only seen once a week    Family there quite a bit  They don't believe she'll participate try to encourage her to exercise       The following portions of the patient's history were reviewed and updated as appropriate: allergies, current medications, past family history, past social history, past surgical history and problem list.    Review of Systems   Constitutional: Positive for fatigue. Negative for activity change, appetite change, chills, fever and unexpected weight change.   HENT: Negative.    Eyes: Negative.    Respiratory: Negative.    Gastrointestinal: Negative.    Genitourinary: Negative.    Musculoskeletal: Positive for gait problem.   Neurological: Negative for dizziness, speech difficulty and weakness.   Psychiatric/Behavioral: Negative.        Objective   Physical Exam   Constitutional: She is oriented to person, place, and time. She appears well-developed.   Patient appears frail but without acute illness   HENT:   Mouth/Throat: Oropharynx is clear and moist.   Neck: No JVD present.   Cardiovascular: Normal rate,  regular rhythm and normal heart sounds.    Carotids clear   Pulmonary/Chest: Effort normal and breath sounds normal.   Abdominal: Soft. Bowel sounds are normal.   Musculoskeletal: She exhibits no edema or tenderness.   Lymphadenopathy:     She has no cervical adenopathy.   Neurological: She is alert and oriented to person, place, and time.   Skin: Skin is warm.   Psychiatric: She has a normal mood and affect. Her behavior is normal. Judgment and thought content normal.   Vitals reviewed.      Assessment/Plan   Carrie was seen today for urinary tract infection.    Diagnoses and all orders for this visit:    Frequent falls    Weakness    Urinary tract infection without hematuria, site unspecified  Comments:  Possible UTI based on history, patient unable to urinate for me    Other orders  -     fosinopril (MONOPRIL) 10 MG tablet; Take 0.5 tablets by mouth Daily.  -     sulfamethoxazole-trimethoprim (BACTRIM DS) 800-160 MG per tablet; Take 1 tablet by mouth Daily for 5 days. Daily 5 days                  Unable to obtain a urine  She has no other symptoms are UTI  Family think she may have one  Her blood pressure is 100/60  She may be having some orthostatic hypotension as well? Or not even have a UTI?  She's new to me, I think it's best to cover her with an antibiotic based on history  But also to adjust her blood pressure medicine with the short follow-up  Bactrim has been decreased due to chronic renal insufficiency    Decrease monopril   5 mg daily  Hold Bumex  Short-term follow-up    If acute confusion weakness speech syncope emergency room  Discusses the cardiologist as well next follow-up and follow cardiologist device on Bumex and Monopril

## 2018-02-26 NOTE — TELEPHONE ENCOUNTER
F/u bp 98/58  Dr Mayorga received notice    Discontinue Monopril  Plenty of fluids    Keep appointment Thursday      Discontinue Monopril 5 mg  It was recently discontinued but her blood pressure remains on the low side  She will need to keep her follow-up as well and schedule Dr. Rehman cardiology  Plenty of fluids    Keep appointment Thursday    Thank you

## 2018-03-01 ENCOUNTER — OFFICE VISIT (OUTPATIENT)
Dept: CARDIOLOGY | Facility: CLINIC | Age: 83
End: 2018-03-01

## 2018-03-01 VITALS
HEART RATE: 82 BPM | SYSTOLIC BLOOD PRESSURE: 100 MMHG | DIASTOLIC BLOOD PRESSURE: 60 MMHG | BODY MASS INDEX: 28.07 KG/M2 | WEIGHT: 143 LBS | HEIGHT: 60 IN

## 2018-03-01 DIAGNOSIS — IMO0001 UNCONTROLLED TYPE 2 DIABETES MELLITUS WITHOUT COMPLICATION, WITHOUT LONG-TERM CURRENT USE OF INSULIN: ICD-10-CM

## 2018-03-01 DIAGNOSIS — I10 ESSENTIAL HYPERTENSION: ICD-10-CM

## 2018-03-01 DIAGNOSIS — I11.9 BENIGN HYPERTENSIVE HEART DISEASE WITHOUT CONGESTIVE HEART FAILURE: ICD-10-CM

## 2018-03-01 DIAGNOSIS — R29.6 FREQUENT FALLS: Primary | ICD-10-CM

## 2018-03-01 PROCEDURE — 99214 OFFICE O/P EST MOD 30 MIN: CPT | Performed by: NURSE PRACTITIONER

## 2018-03-01 PROCEDURE — 93000 ELECTROCARDIOGRAM COMPLETE: CPT | Performed by: NURSE PRACTITIONER

## 2018-03-01 NOTE — PROGRESS NOTES
Date of Office Visit: 2018  Encounter Provider: BERNARD Franco  Place of Service: [facility] CARDIOLOGY  Patient Name: Carrie Workman  :3/2/1933    Chief Complaint   Patient presents with   • Fall   :     HPI: Carrie Workman is a 84 y.o. female comes in today for falls. Dr. Mcneal asked that she been seen due to frequent falls. She is a patient of Dr. Mishra and I am seeing her for the first time today.     She has a history of hypertension and hypertensive heart disease, diabetes and chronic kidney disease.    She had a stress perfusion study in  was negative for ischemia.  An echo in  showing mild LVH, normal systolic function, mild left atrial enlargement and aortic valve sclerosis.    She is wheelchair-bound.    She comes in today with her daughters. The patient lives in an assisted living facility. She is wheelchair bound. There is a nurse on staff, but does not check blood pressures or help with medications. She does get physical therapy and they do check her blood pressure. Primary care was concerned about the patient having frequent falls. The patient is deadweight with transfers. If she does not call for help or only has one assist, then she tends to fall because she cannot stand on her own. The patient denies any dizziness. She denies any chest pain or chest pressure. Denies any edema. She does not eat well. When I discussed the potential for the patient to be a better candidate for a nursing home so she could have constant care around the clock, the patient was very adamant that she did not want this. The daughters report that the falls have been going on for several years and she has been very sedentary for several years.         Past Medical History:   Diagnosis Date   • Abnormal electrocardiogram    • Ataxia     chronic, unknown etiology   • Benign hypertensive heart disease    • CHF (congestive heart failure)    • CKD (chronic kidney disease)   "  • Diabetes mellitus    • Dyslipidemia    • Health care maintenance    • History of EKG 12/06/2013   • Hyperlipidemia    • Hypertension    • Renal cyst    • Renal insufficiency    • TIA (transient ischemic attack)     2006   • Urinary frequency    • Visit for screening mammogram        No past surgical history on file.        Review of Systems   Constitution: Negative for fever and malaise/fatigue.   HENT: Negative for ear pain, hearing loss, nosebleeds and sore throat.    Eyes: Negative for double vision, pain, vision loss in left eye, vision loss in right eye and visual disturbance.   Cardiovascular: Negative for claudication and leg swelling.   Respiratory: Negative for cough, snoring and wheezing.    Endocrine: Negative for cold intolerance, heat intolerance and polyuria.   Skin: Negative for color change, itching and rash.   Musculoskeletal: Negative for joint pain, joint swelling and muscle cramps.   Gastrointestinal: Negative for abdominal pain, diarrhea, melena, nausea and vomiting.   Genitourinary: Negative for bladder incontinence and hematuria.   Neurological: Negative for excessive daytime sleepiness, dizziness, light-headedness, paresthesias and seizures.   Psychiatric/Behavioral: Negative for depression. The patient is not nervous/anxious.    All other systems reviewed and are negative.    All other systems reviewed and are negative    No Known Allergies    All aspects of family and social history reviewed.          Objective:     Vitals:    03/01/18 1348   BP: 100/60   BP Location: Left arm   Pulse: 82   Weight: 64.9 kg (143 lb)   Height: 152.4 cm (60\")     Body mass index is 27.93 kg/(m^2).    PHYSICAL EXAM:  Physical Exam   Constitutional: She is oriented to person, place, and time. She appears well-developed and well-nourished.   HENT:   Head: Normocephalic.   Nose: Nose normal.   Mouth/Throat: Oropharynx is clear and moist.   Eyes: Conjunctivae and EOM are normal. Pupils are equal, round, and " reactive to light. Right eye exhibits no discharge. No scleral icterus.   Neck: Normal range of motion. Neck supple. No JVD present. No thyromegaly present.   Cardiovascular: Normal rate, regular rhythm, normal heart sounds and intact distal pulses.  Exam reveals no gallop and no friction rub.    No murmur heard.  Pulses:       Carotid pulses are 2+ on the right side, and 2+ on the left side.       Radial pulses are 2+ on the right side, and 2+ on the left side.        Femoral pulses are 2+ on the right side, and 2+ on the left side.       Popliteal pulses are 2+ on the right side, and 2+ on the left side.        Dorsalis pedis pulses are 2+ on the right side, and 2+ on the left side.        Posterior tibial pulses are 2+ on the right side, and 2+ on the left side.   Pulmonary/Chest: Effort normal and breath sounds normal. No respiratory distress. She has no wheezes. She has no rales.   Abdominal: Soft. Bowel sounds are normal. She exhibits no distension. There is no hepatosplenomegaly. There is no tenderness. There is no rebound.   Musculoskeletal: Normal range of motion. She exhibits no edema or tenderness.   Neurological: She is alert and oriented to person, place, and time.   Skin: Skin is warm and dry. No rash noted. No erythema.   Psychiatric: She has a normal mood and affect. Her behavior is normal. Judgment and thought content normal.   Vitals reviewed.        ECG 12 Lead  Date/Time: 3/1/2018 2:20 PM  Performed by: BENJI MANUEL  Authorized by: BENJI MANUEL   Comparison: compared with previous ECG from 2/21/2017  Rhythm: sinus rhythm  Rate: normal  BPM: 82  Conduction: LAFB  ST Segments: ST segments normal  T Waves: T waves normal  QRS axis: normal  Other: no other findings  Clinical impression: abnormal ECG  Comments: Indication: falls, htn                Assessment:       Diagnosis Plan   1. Frequent falls     2. Essential hypertension     3. Benign hypertensive heart disease without congestive  heart failure     4. Uncontrolled type 2 diabetes mellitus without complication, without long-term current use of insulin          Orders Placed This Encounter   Procedures   • ECG 12 Lead     This order was created via procedure documentation       Current Outpatient Prescriptions   Medication Sig Dispense Refill   • aspirin 81 MG tablet Take 81 mg by mouth daily.     • Calcium-Vitamin D-Vitamin K (VIACTIV) 500-500-40 MG-UNT-MCG chewable tablet Chew 1 tablet daily.     • citalopram (CELEXA) 10 MG tablet Take 1 tablet by mouth Daily. 30 tablet 5   • glimepiride (AMARYL) 2 MG tablet Take 1 tablet by mouth Every Morning Before Breakfast. 30 tablet 5   • metoprolol succinate XL (TOPROL-XL) 50 MG 24 hr tablet Take 1 tablet (50mg) po daily 90 tablet 3   • ondansetron ODT (ZOFRAN-ODT) 4 MG disintegrating tablet Take 1 tablet by mouth Every 8 (Eight) Hours As Needed for Nausea or Vomiting. 12 tablet 0   • pravastatin (PRAVACHOL) 40 MG tablet TAKE 1 TABLET BY MOUTH DAILY. 90 tablet 3   • vitamin E 400 UNIT capsule Take 400 Units by mouth.       No current facility-administered medications for this visit.             Plan:       1. Falls-Patient has frequent falls and has for several years.  Her blood pressure is low but I do not think that this leads to her falls.  It's due to her instability, weakness and just overall frailty.  I recommended that she be in a nursing home rather than assisted living but the patient is very adamant that she does not want to be there.  She needs more assistance than what she can get in assisted living.    2. HTN-patient has a history of hypertension.  Her blood pressure has been running low.  Primary care stopped her bumetanide.  They also cut her fosinopril in half.  I will stop the fosinopril per now.  Continue the Toprol-XL.  I would recommend decreasing this of her blood pressure does not improve.  I do not think that her hypotension is related to her falls.    3. Diabetes-followed by  PCP    Follow up in office in 1 year. Continue to decrease bp meds if needed. Pt is a DNR.     As always, it has been a pleasure to participate in this patient's care.      Sincerely,      BERNARD Franco

## 2018-03-06 ENCOUNTER — TELEPHONE (OUTPATIENT)
Dept: FAMILY MEDICINE CLINIC | Facility: CLINIC | Age: 83
End: 2018-03-06

## 2018-03-08 ENCOUNTER — OFFICE VISIT (OUTPATIENT)
Dept: FAMILY MEDICINE CLINIC | Facility: CLINIC | Age: 83
End: 2018-03-08

## 2018-03-08 VITALS
SYSTOLIC BLOOD PRESSURE: 104 MMHG | HEART RATE: 74 BPM | TEMPERATURE: 98 F | OXYGEN SATURATION: 98 % | DIASTOLIC BLOOD PRESSURE: 60 MMHG

## 2018-03-08 DIAGNOSIS — I10 ESSENTIAL HYPERTENSION: Primary | ICD-10-CM

## 2018-03-08 DIAGNOSIS — IMO0001 UNCONTROLLED TYPE 2 DIABETES MELLITUS WITHOUT COMPLICATION, WITHOUT LONG-TERM CURRENT USE OF INSULIN: ICD-10-CM

## 2018-03-08 DIAGNOSIS — N18.30 CHRONIC KIDNEY DISEASE, STAGE III (MODERATE) (HCC): ICD-10-CM

## 2018-03-08 DIAGNOSIS — E78.5 HYPERLIPIDEMIA, UNSPECIFIED HYPERLIPIDEMIA TYPE: ICD-10-CM

## 2018-03-08 LAB
ALBUMIN SERPL-MCNC: 4 G/DL (ref 3.5–5.2)
ALBUMIN/GLOB SERPL: 1.9 G/DL
ALP SERPL-CCNC: 60 U/L (ref 39–117)
ALT SERPL-CCNC: 33 U/L (ref 1–33)
AST SERPL-CCNC: 29 U/L (ref 1–32)
BILIRUB SERPL-MCNC: 0.9 MG/DL (ref 0.1–1.2)
BUN SERPL-MCNC: 14 MG/DL (ref 8–23)
BUN/CREAT SERPL: 11.3 (ref 7–25)
CALCIUM SERPL-MCNC: 9.5 MG/DL (ref 8.6–10.5)
CHLORIDE SERPL-SCNC: 105 MMOL/L (ref 98–107)
CHOLEST SERPL-MCNC: 142 MG/DL (ref 0–200)
CO2 SERPL-SCNC: 26.8 MMOL/L (ref 22–29)
CREAT SERPL-MCNC: 1.24 MG/DL (ref 0.57–1)
GFR SERPLBLD CREATININE-BSD FMLA CKD-EPI: 41 ML/MIN/1.73
GFR SERPLBLD CREATININE-BSD FMLA CKD-EPI: 50 ML/MIN/1.73
GLOBULIN SER CALC-MCNC: 2.1 GM/DL
GLUCOSE SERPL-MCNC: 124 MG/DL (ref 65–99)
HBA1C MFR BLD: 6.11 % (ref 4.8–5.6)
HDLC SERPL-MCNC: 55 MG/DL (ref 40–60)
LDLC SERPL CALC-MCNC: 68 MG/DL (ref 0–100)
LDLC/HDLC SERPL: 1.23 {RATIO}
POTASSIUM SERPL-SCNC: 4.5 MMOL/L (ref 3.5–5.2)
PROT SERPL-MCNC: 6.1 G/DL (ref 6–8.5)
SODIUM SERPL-SCNC: 144 MMOL/L (ref 136–145)
T4 FREE SERPL-MCNC: 1.19 NG/DL (ref 0.93–1.7)
TRIGL SERPL-MCNC: 97 MG/DL (ref 0–150)
TSH SERPL DL<=0.005 MIU/L-ACNC: 4.48 MIU/ML (ref 0.27–4.2)
VLDLC SERPL CALC-MCNC: 19.4 MG/DL (ref 5–40)

## 2018-03-08 PROCEDURE — 99213 OFFICE O/P EST LOW 20 MIN: CPT | Performed by: NURSE PRACTITIONER

## 2018-03-08 RX ORDER — METOPROLOL SUCCINATE 25 MG/1
25 TABLET, EXTENDED RELEASE ORAL DAILY
Qty: 30 TABLET | Refills: 5 | Status: SHIPPED | OUTPATIENT
Start: 2018-03-08 | End: 2018-07-30 | Stop reason: SDUPTHER

## 2018-03-08 NOTE — PATIENT INSTRUCTIONS
Discharge instructions    Plenty of fluids at least 64 ounces a day    Decrease metoprolol 50 mg, down to 25 mg starting tomorrow    Family to assist patient sit to stand  10 times each visit to strengthen patient and keep her more independent    Call me in 2 weeks blood pressure readings and heart rate    Follow-up with Dr. Mayorga in 2 months      Call or return to office if increased swelling of the legs    Thank You,      James Epley,  NP

## 2018-03-08 NOTE — PROGRESS NOTES
Subjective   Carrie Workman is a 85 y.o. female.     HPI Comments: Follow-up blood pressure hypertension  Unable to bring a urine today  No frequency urgency  Recheck blood pressure 104/60    Recently seen cardiology  They suspected most of her weakness and falls are related to old age  Chronic weakness and mobility  They recommended patient be in a nursing home I agree  I discussed this with patient and family  Patient is oriented and competent  She does not wish to be a nursing home, she understands she is at higher risk of falls and severe injury with assisted living  She is willing to take that risk to keep her independence    Encouraged family to work with patient standing and sitting at least 10 times each visit  Watch for any swelling of the legs  Sudden weight gain which could be edema call me as I recently decreased her Bumex    Recheck blood pressure 104/60    Reviewed records 2 years ago Dr Mishra 118 systolic    Diabetes type 2 previously controlled  Does not check glucose at home             The following portions of the patient's history were reviewed and updated as appropriate: allergies, current medications, past medical history, past social history, past surgical history and problem list.    Review of Systems   Constitutional: Negative for chills and fever.   HENT: Negative.    Respiratory: Negative.  Negative for shortness of breath.    Gastrointestinal: Negative.    Genitourinary: Positive for dysuria and frequency.   Musculoskeletal:        Gait instability global weakness   Neurological: Negative.        Objective   Physical Exam   Constitutional: She appears well-developed.   HENT:   Mouth/Throat: Oropharynx is clear and moist.   Eyes: Pupils are equal, round, and reactive to light.   Cardiovascular: Normal rate and regular rhythm.    Pulmonary/Chest: Effort normal and breath sounds normal.   Abdominal: Soft. Bowel sounds are normal. She exhibits no distension.   Musculoskeletal: She exhibits  edema. She exhibits no tenderness.   Mild pedal edema, trace tibia tenderness   Neurological: She is alert.   Skin: Skin is warm and dry.   Psychiatric:   Alert  Oriented to place situation  Can answer some basic questions  Appropriate affect flat   Vitals reviewed.      Assessment/Plan   Carrie was seen today for follow-up on uti.    Diagnoses and all orders for this visit:    Essential hypertension  -     Comprehensive Metabolic Panel  -     Hemoglobin A1c  -     TSH Rfx On Abnormal To Free T4  -     Lipid Panel With LDL / HDL Ratio    Hyperlipidemia, unspecified hyperlipidemia type  -     Comprehensive Metabolic Panel  -     Hemoglobin A1c  -     TSH Rfx On Abnormal To Free T4  -     Lipid Panel With LDL / HDL Ratio    Uncontrolled type 2 diabetes mellitus without complication, without long-term current use of insulin  -     Comprehensive Metabolic Panel  -     Hemoglobin A1c  -     TSH Rfx On Abnormal To Free T4  -     Lipid Panel With LDL / HDL Ratio    Chronic kidney disease, stage III (moderate)  -     Comprehensive Metabolic Panel  -     Hemoglobin A1c  -     TSH Rfx On Abnormal To Free T4  -     Lipid Panel With LDL / HDL Ratio    Other orders  -     metoprolol succinate XL (TOPROL XL) 25 MG 24 hr tablet; Take 1 tablet by mouth Daily. Her blood pressure and history of heart disease                  Addendum 4/13/2018    I've been asked to comment on patient's mobility, our conversation which had taken place 3/8/2018  Re: Patient, family request for manual wheelchair    Patient has severe mobility limitations and significantly impairs her ability ADLs, or mobility related activities of daily living.  Patient's mobility is quite limited and does not have the strength to use walker cane etc.  The use of a manual will chair will significantly improve patient's ability for MRA DLS and likely will use it on a regular basis.  Specifically the patient did not express any unwillingness to do so, and fact she wants  to maximize the ability to stay in assisted living  First is any nursing home environment.  The patient likely has sufficient upper extremity function as well as the ability to maneuver and safely propel a manual wheelchair, and at times  She may have assistance of a caregiver such as family to assist propelling.    We specifically discussed patient's mobility, difficulty transferring, home situation related to mobility, restroom, ADLs and I believe in good radha I am able to document this  Today April 13, 2018    Thank You,      James Epley, NP      Discharge instructions    Plenty of fluids at least 64 ounces a day    Decrease metoprolol 50 mg, down to 25 mg starting tomorrow    Family to assist patient sit to stand  10 times each visit to strengthen patient and keep her more independent    Call me in 2 weeks blood pressure readings and heart rate    Follow-up with Dr. Mayorga in 2 months      Call or return to office if increased swelling of the legs    Thank You,      James Epley,  WALTER

## 2018-03-26 ENCOUNTER — TELEPHONE (OUTPATIENT)
Dept: FAMILY MEDICINE CLINIC | Facility: CLINIC | Age: 83
End: 2018-03-26

## 2018-03-26 NOTE — TELEPHONE ENCOUNTER
----- Message from James Epley, APRN sent at 3/16/2018  8:52 AM EDT -----  Please tell patient  Kidney function improved some and stable   diabetes well controlled  Cholesterol great  Thyroid test borderline  We'll repeat this in several months    Thank you!

## 2018-04-13 ENCOUNTER — TELEPHONE (OUTPATIENT)
Dept: FAMILY MEDICINE CLINIC | Facility: CLINIC | Age: 83
End: 2018-04-13

## 2018-04-27 ENCOUNTER — TELEPHONE (OUTPATIENT)
Dept: FAMILY MEDICINE CLINIC | Facility: CLINIC | Age: 83
End: 2018-04-27

## 2018-04-27 NOTE — TELEPHONE ENCOUNTER
Home health nurse called stating that the patient was discharged from them and is not having issues swallowing and is choking and needed a verbal to go back in for nursing. I gave the nurse the ok to start treatment

## 2018-05-03 ENCOUNTER — TELEPHONE (OUTPATIENT)
Dept: FAMILY MEDICINE CLINIC | Facility: CLINIC | Age: 83
End: 2018-05-03

## 2018-05-03 NOTE — TELEPHONE ENCOUNTER
It would be great for this pt to be evaluated in the office. I don't think we need a CXR at this time with the hx given. If she is aspirating the treatment would be speech therapy and adjusting her diet, liquid consistency per recommendations.

## 2018-05-03 NOTE — TELEPHONE ENCOUNTER
Patient's daughter called wondering if the patient needs an chest xray due to her having a coughing spell just to check to see if she aspirated or anything.

## 2018-05-08 ENCOUNTER — TELEPHONE (OUTPATIENT)
Dept: FAMILY MEDICINE CLINIC | Facility: CLINIC | Age: 83
End: 2018-05-08

## 2018-05-08 NOTE — TELEPHONE ENCOUNTER
Left a detailed message for patient's daughter in regards to her concerns with her mother. Gave the patient's daughter suggestions per Dr Mayorga

## 2018-05-09 ENCOUNTER — TELEPHONE (OUTPATIENT)
Dept: FAMILY MEDICINE CLINIC | Facility: CLINIC | Age: 83
End: 2018-05-09

## 2018-05-09 NOTE — TELEPHONE ENCOUNTER
Patient's daughter called requesting medical records for the patient due to her moving facilities. Daughter explained there are new diagnosis that will need to be noted, I let the daughter know that the patient was last seen by NP James Epley and those diagnosis were not noted during those visits, patient has an upcoming appointment with Dr Mayorga on 5/17. Office notes from two visits with James Epley APRN will be printed as well as a medication list per daughters request and she will pick them up in the morning as well as sign new release of information.

## 2018-06-12 RX ORDER — FOSINOPRIL SODIUM 10 MG/1
TABLET ORAL
Qty: 90 TABLET | Refills: 1 | OUTPATIENT
Start: 2018-06-12

## 2018-06-25 DIAGNOSIS — E78.49 OTHER HYPERLIPIDEMIA: ICD-10-CM

## 2018-06-25 RX ORDER — PRAVASTATIN SODIUM 40 MG
40 TABLET ORAL DAILY
Qty: 90 TABLET | Refills: 1 | Status: SHIPPED | OUTPATIENT
Start: 2018-06-25

## 2018-07-10 DIAGNOSIS — IMO0001 UNCONTROLLED TYPE 2 DIABETES MELLITUS WITHOUT COMPLICATION, WITHOUT LONG-TERM CURRENT USE OF INSULIN: ICD-10-CM

## 2018-07-10 RX ORDER — GLIMEPIRIDE 2 MG/1
2 TABLET ORAL
Qty: 30 TABLET | Refills: 5 | Status: SHIPPED | OUTPATIENT
Start: 2018-07-10

## 2018-07-30 RX ORDER — METOPROLOL SUCCINATE 25 MG/1
25 TABLET, EXTENDED RELEASE ORAL DAILY
Qty: 30 TABLET | Refills: 4 | Status: SHIPPED | OUTPATIENT
Start: 2018-07-30

## 2019-01-31 NOTE — TELEPHONE ENCOUNTER
Pt still stating nausea but vomiting has subsided. More zofran given.    [facility] nurse called to extend PT for patient  due to falls and weakness. I gave the verbal for 2 times a week for 2 weeks and then 1 time a week for 1 week